# Patient Record
Sex: FEMALE | Race: WHITE | NOT HISPANIC OR LATINO | URBAN - METROPOLITAN AREA
[De-identification: names, ages, dates, MRNs, and addresses within clinical notes are randomized per-mention and may not be internally consistent; named-entity substitution may affect disease eponyms.]

---

## 2017-09-29 ENCOUNTER — IMPORTED ENCOUNTER (OUTPATIENT)
Dept: URBAN - METROPOLITAN AREA CLINIC 31 | Facility: CLINIC | Age: 67
End: 2017-09-29

## 2017-09-29 PROBLEM — H40.003: Noted: 2017-09-29

## 2017-09-29 PROCEDURE — 92015 DETERMINE REFRACTIVE STATE: CPT

## 2017-09-29 PROCEDURE — 92250 FUNDUS PHOTOGRAPHY W/I&R: CPT

## 2017-09-29 PROCEDURE — 92014 COMPRE OPH EXAM EST PT 1/>: CPT

## 2017-09-29 PROCEDURE — 92133 CPTRZD OPH DX IMG PST SGM ON: CPT

## 2017-09-29 NOTE — PATIENT DISCUSSION
1.  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 Brother has large ones also. Questionable signs of glaucomatous damage to the optic nerve based on todays examination and testing. Will continue to monitor. LVF 11/16 OD borderline OS normal.  OCT 9/29/17 OD normal OS thinning sup/temp. RNFL 97/76.  2. Rx change3. Going to [facility] for 2 mths. 4.  RTN 1 yr CE/OCT-  has VSP for gls

## 2018-04-04 ENCOUNTER — IMPORTED ENCOUNTER (OUTPATIENT)
Dept: URBAN - METROPOLITAN AREA CLINIC 31 | Facility: CLINIC | Age: 68
End: 2018-04-04

## 2018-04-04 PROBLEM — H43.811: Noted: 2018-04-04

## 2018-04-04 PROBLEM — H40.003: Noted: 2018-04-04

## 2018-04-04 PROCEDURE — 99214 OFFICE O/P EST MOD 30 MIN: CPT

## 2018-04-04 NOTE — PATIENT DISCUSSION
1.  NEW PVD OD: Patient was cautioned to call our office immediately if they experience a substantial change in their symptoms such as an increase in floaters persistent flashes loss of visual field (may appear as a shadow or a curtain) or decrease in visual acuity as these may indicate a retinal tear or detachment. 2.  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 Brother has large ones also. Questionable signs of glaucomatous damage to the optic nerve based on todays examination and testing. Will continue to monitor. LVF 11/16 OD borderline OS normal.  OCT 9/29/17 OD normal OS thinning sup/temp. RNFL 97/76. 3. Going to [facility] for 2 mths. 4.  RTN  1 mth DF/OPTOS OD5.  RTN 9/18 CE/OCT-  has VSP for gls

## 2018-04-04 NOTE — PATIENT DISCUSSION
1.  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 Brother has large ones also. Questionable signs of glaucomatous damage to the optic nerve based on todays examination and testing. Will continue to monitor. LVF 11/16 OD borderline OS normal.  OCT 9/29/17 OD normal OS thinning sup/temp. RNFL 97/76.  2. Rx change3. Going to [facility] for 2 mths. 4.  RTN 1 yr CE/OCT-  has VSP for gls

## 2018-05-04 ENCOUNTER — IMPORTED ENCOUNTER (OUTPATIENT)
Dept: URBAN - METROPOLITAN AREA CLINIC 31 | Facility: CLINIC | Age: 68
End: 2018-05-04

## 2018-05-04 PROBLEM — H40.003: Noted: 2018-05-04

## 2018-05-04 PROBLEM — H43.811: Noted: 2018-05-04

## 2018-05-04 PROCEDURE — 99214 OFFICE O/P EST MOD 30 MIN: CPT

## 2018-05-04 NOTE — PATIENT DISCUSSION
1.  PVD OD:   MUch improved per pt. Not seeing it much at all. Patient was cautioned to call our office immediately if they experience a substantial change in their symptoms such as an increase in floaters persistent flashes loss of visual field . 2.  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 Brother has large ones also. Questionable signs of glaucomatous damage to the optic nerve based on todays examination and testing. Will continue to monitor. LVF 11/16 OD borderline OS normal.  OCT 9/29/17 OD normal OS thinning sup/temp. RNFL 97/76. 3. Going to [facility] for 2 mths. 4. RTN  9/18 CE/OCT-  has VSP for gls

## 2018-09-24 ENCOUNTER — IMPORTED ENCOUNTER (OUTPATIENT)
Dept: URBAN - METROPOLITAN AREA CLINIC 31 | Facility: CLINIC | Age: 68
End: 2018-09-24

## 2018-09-24 PROBLEM — H43.811: Noted: 2018-09-24

## 2018-09-24 PROBLEM — H40.003: Noted: 2018-09-24

## 2018-09-24 PROBLEM — H40.013: Noted: 2018-09-24

## 2018-09-24 PROBLEM — H25.013: Noted: 2018-09-24

## 2018-09-24 PROCEDURE — 92015 DETERMINE REFRACTIVE STATE: CPT

## 2018-09-24 PROCEDURE — 92250 FUNDUS PHOTOGRAPHY W/I&R: CPT

## 2018-09-24 PROCEDURE — 92014 COMPRE OPH EXAM EST PT 1/>: CPT

## 2018-09-24 PROCEDURE — 92133 CPTRZD OPH DX IMG PST SGM ON: CPT

## 2018-09-24 NOTE — PATIENT DISCUSSION
1.  PVD OD:   MUch improved per pt. Not seeing it much at all. Patient was cautioned to call our office immediately if they experience a substantial change in their symptoms such as an increase in floaters persistent flashes loss of visual field . 2.  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 Brother has large ones also. Questionable signs of glaucomatous damage to the optic nerve based on todays examination and testing. Will continue to monitor. LVF 11/16 OD borderline OS normal.  OCT 9/29/17 OD normal OS thinning sup/temp. RNFL 97/76. 3. Going to [facility] for 2 mths. 4. RTN  9/18 CE/OCT-  has VSP for gls

## 2018-09-24 NOTE — PATIENT DISCUSSION
1. Trace Cataract OU: Explained how cataracts can effect vision. Recommend clinical observation. The patient was advised to contact us if any change or worsening of vision. 2. PVD OD:   MUch improved per pt. Not seeing it much at all. Patient was cautioned to call our office immediately if they experience a substantial change in their symptoms such as an increase in floaters persistent flashes loss of visual field . 3.  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 Brother has large ones also. Questionable signs of glaucomatous damage to the optic nerve based on todays examination and testing. Will continue to monitor. LVF 11/16 OD borderline OS normal.  OCT 9/24/18 OU thinning with OS more. RNFL 94/93.  worse than prev yr.  4. No rx changes. 5.  RTN 6 mths VF/OPtos. 6.  RTN  9/19 CE/OCT-  has VSP for gls

## 2019-03-08 ENCOUNTER — IMPORTED ENCOUNTER (OUTPATIENT)
Dept: URBAN - METROPOLITAN AREA CLINIC 31 | Facility: CLINIC | Age: 69
End: 2019-03-08

## 2019-03-08 PROBLEM — H43.811: Noted: 2019-03-08

## 2019-03-08 PROBLEM — H25.013: Noted: 2019-03-08

## 2019-03-08 PROBLEM — H40.003: Noted: 2019-03-08

## 2019-03-08 PROCEDURE — 92083 EXTENDED VISUAL FIELD XM: CPT

## 2019-03-08 PROCEDURE — 99213 OFFICE O/P EST LOW 20 MIN: CPT

## 2019-03-08 PROCEDURE — 92250 FUNDUS PHOTOGRAPHY W/I&R: CPT

## 2019-03-08 NOTE — PATIENT DISCUSSION
1. Trace Cataract OU: Explained how cataracts can effect vision. Recommend clinical observation. The patient was advised to contact us if any change or worsening of vision. 2. PVD OD:   MUch improved per pt. Not seeing it much at all. Patient was cautioned to call our office immediately if they experience a substantial change in their symptoms such as an increase in floaters persistent flashes loss of visual field . 3.  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 Brother has large ones also. Questionable signs of glaucomatous damage to the optic nerve based on todays examination and testing. Will continue to monitor. LVF 3/8/19 Stable from 2016. OD borderline OS normal.  OCT 9/24/18 OU thinning with OS more. RNFL 94/93.  worse than prev yr.  4. No rx changes.  5.  RTN 9/19 CE/OCT-  has VSP for gls

## 2019-10-04 ENCOUNTER — IMPORTED ENCOUNTER (OUTPATIENT)
Dept: URBAN - METROPOLITAN AREA CLINIC 31 | Facility: CLINIC | Age: 69
End: 2019-10-04

## 2019-10-04 PROBLEM — H40.003: Noted: 2019-10-04

## 2019-10-04 PROBLEM — H25.013: Noted: 2019-10-04

## 2019-10-04 PROBLEM — H43.811: Noted: 2019-10-04

## 2019-10-04 PROCEDURE — 92133 CPTRZD OPH DX IMG PST SGM ON: CPT

## 2019-10-04 PROCEDURE — 92014 COMPRE OPH EXAM EST PT 1/>: CPT

## 2019-10-04 PROCEDURE — 92015 DETERMINE REFRACTIVE STATE: CPT

## 2019-10-04 NOTE — PATIENT DISCUSSION
1. Trace Cataract OU: Explained how cataracts can effect vision. Recommend clinical observation. The patient was advised to contact us if any change or worsening of vision. 2. PVD OD:   MUch improved per pt. Not seeing it much at all. Patient was cautioned to call our office immediately if they experience a substantial change in their symptoms such as an increase in floaters persistent flashes loss of visual field . 3.  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 Brother has large ones also. Questionable signs of glaucomatous damage to the optic nerve based on todays examination and testing. Will continue to monitor. LVF 3/8/19 Stable from 2016. OD borderline OS normal.  OCT 10/4/19 OU thinning. RNFL 88/97.  stable 4. No rx changes. 5. RTN 6 mths DF/VF/optos6.    RTN 9/20 CE/OCT-  has VSP for gls

## 2019-12-20 NOTE — PATIENT DISCUSSION
High risk of CNVM, trace cystic change, Follow up as scheduled with CHUCK in 2 months-if fluid on OCT at that time refer back earlier to TP[facility].  Discussed increased risk of floaters, myopic degeneration and retinal detachment associated with high myopia.

## 2020-05-07 ENCOUNTER — IMPORTED ENCOUNTER (OUTPATIENT)
Dept: URBAN - METROPOLITAN AREA CLINIC 31 | Facility: CLINIC | Age: 70
End: 2020-05-07

## 2020-05-07 PROBLEM — H40.003: Noted: 2020-05-07

## 2020-05-07 PROBLEM — H25.013: Noted: 2020-05-07

## 2020-05-07 PROBLEM — H43.811: Noted: 2020-05-07

## 2020-05-07 PROBLEM — H40.013: Noted: 2020-05-07

## 2020-05-07 PROCEDURE — 99214 OFFICE O/P EST MOD 30 MIN: CPT

## 2020-05-07 PROCEDURE — 92250 FUNDUS PHOTOGRAPHY W/I&R: CPT

## 2020-05-07 NOTE — PATIENT DISCUSSION
1. Trace Cataract OU: Explained how cataracts can effect vision. Recommend clinical observation. The patient was advised to contact us if any change or worsening of vision. 2. PVD OD:   MUch improved per pt. Not seeing it much at all. Patient was cautioned to call our office immediately if they experience a substantial change in their symptoms such as an increase in floaters persistent flashes loss of visual field . 3.  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 Brother has large ones also. Questionable signs of glaucomatous damage to the optic nerve based on todays examination and testing. Will continue to monitor. LVF 3/8/19 Stable from 2016. OD borderline OS normal.  OCT 10/4/19 OU thinning. RNFL 88/97.  stable 4. No rx changes.  5. RTN 11/20 CE/VF and poss OCT nerve-  has VSP for gls---VF did not get done in May due to virus--

## 2020-10-09 ENCOUNTER — IMPORTED ENCOUNTER (OUTPATIENT)
Dept: URBAN - METROPOLITAN AREA CLINIC 31 | Facility: CLINIC | Age: 70
End: 2020-10-09

## 2020-10-09 PROBLEM — H40.003: Noted: 2020-10-09

## 2020-10-09 PROBLEM — H40.013: Noted: 2020-10-09

## 2020-10-09 PROBLEM — H43.811: Noted: 2020-10-09

## 2020-10-09 PROBLEM — H25.013: Noted: 2020-10-09

## 2020-10-09 PROCEDURE — 92250 FUNDUS PHOTOGRAPHY W/I&R: CPT

## 2020-10-09 PROCEDURE — 92014 COMPRE OPH EXAM EST PT 1/>: CPT

## 2020-10-09 PROCEDURE — 92015 DETERMINE REFRACTIVE STATE: CPT

## 2020-10-09 PROCEDURE — 92083 EXTENDED VISUAL FIELD XM: CPT

## 2020-10-09 NOTE — PATIENT DISCUSSION
1. Trace Cataract OU: Explained how cataracts can effect vision. Recommend clinical observation. The patient was advised to contact us if any change or worsening of vision. 2. PVD OD:   MUch improved per pt. Not seeing it much at all. Patient was cautioned to call our office immediately if they experience a substantial change in their symptoms such as an increase in floaters persistent flashes loss of visual field . 3.  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 Brother has large ones also. Questionable signs of glaucomatous damage to the optic nerve based on todays examination and testing. Will continue to monitor. LVF  10/9/20   OU normal. stable. OCT 10/4/19 OU thinning. RNFL 88/97.  stable 4. No rx changes.  5. RTN  5/21 DF/OCT Nerve-  has VSP for gls---

## 2021-01-20 NOTE — PATIENT DISCUSSION
Discussed today's IOP and HVF. Discussed possibility of repeating of SLT in the near future. Will continue to monitor closely for now.

## 2021-05-11 ENCOUNTER — IMPORTED ENCOUNTER (OUTPATIENT)
Dept: URBAN - METROPOLITAN AREA CLINIC 31 | Facility: CLINIC | Age: 71
End: 2021-05-11

## 2021-05-11 PROBLEM — H40.013: Noted: 2021-05-11

## 2021-05-11 PROBLEM — H40.003: Noted: 2021-05-11

## 2021-05-11 PROBLEM — H25.013: Noted: 2021-05-11

## 2021-05-11 PROBLEM — H43.811: Noted: 2021-05-11

## 2021-05-11 PROCEDURE — 99214 OFFICE O/P EST MOD 30 MIN: CPT

## 2021-05-11 PROCEDURE — 92250 FUNDUS PHOTOGRAPHY W/I&R: CPT

## 2021-05-11 NOTE — PATIENT DISCUSSION
1.  Cataract OU: Explained how cataracts can effect vision. Recommend clinical observation. The patient was advised to contact us if any change or worsening of vision. 2. PVD OD:   MUch improved per pt. Not seeing it much at all. Patient was cautioned to call our office immediately if they experience a substantial change in their symptoms such as an increase in floaters persistent flashes loss of visual field . 3.  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 Brother has large ones also. Questionable signs of glaucomatous damage to the optic nerve based on todays examination and testing. Will continue to monitor. LVF  10/9/20   OU normal. stable. OCT 10/4/19 OU thinning. RNFL 88/97.  stable 4. No rx changes.  5. RTN 6 mths CE/OCT nerve

## 2021-07-26 NOTE — PATIENT DISCUSSION
Explained CAYETANO and recommended regular use of PF ATs 3-4 times per day and adding PF Gel qhs. Refresh coupon given and recommended PF Refresh Celluvisc gtts.

## 2021-11-09 ENCOUNTER — IMPORTED ENCOUNTER (OUTPATIENT)
Dept: URBAN - METROPOLITAN AREA CLINIC 31 | Facility: CLINIC | Age: 71
End: 2021-11-09

## 2021-11-09 PROBLEM — H43.811: Noted: 2021-11-09

## 2021-11-09 PROBLEM — H40.013: Noted: 2021-11-09

## 2021-11-09 PROBLEM — H25.013: Noted: 2021-11-09

## 2021-11-09 PROBLEM — H40.003: Noted: 2021-11-09

## 2021-11-09 PROCEDURE — 92015 DETERMINE REFRACTIVE STATE: CPT

## 2021-11-09 PROCEDURE — 92133 CPTRZD OPH DX IMG PST SGM ON: CPT

## 2021-11-09 PROCEDURE — 92014 COMPRE OPH EXAM EST PT 1/>: CPT

## 2021-11-09 NOTE — PATIENT DISCUSSION
1.  Cataract OU: Explained how cataracts can effect vision. Recommend clinical observation. The patient was advised to contact us if any change or worsening of vision. 2. PVD OD:   MUch improved per pt. Not seeing it much at all. Patient was cautioned to call our office immediately if they experience a substantial change in their symptoms such as an increase in floaters persistent flashes loss of visual field . 3.  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 Brother has large ones also. Questionable signs of glaucomatous damage to the optic nerve based on todays examination and testing. Will continue to monitor. LVF  10/9/20   OU normal. stable. OCT 11/9/21 OU thinning. RNFL 106/109 from 88/97.  stable 4. No rx changes.  5. RTN 12 mths CE/VF/OPtos--Pt agreed to be seen every 12 mths now

## 2022-02-09 NOTE — PATIENT DISCUSSION
Pt was following with Dr. Frank Cruz yearly and has not followed with [facility] since 12/2020. Explained importance of following with [facility] and advised our office will assist in scheduling appt.

## 2022-02-09 NOTE — PATIENT DISCUSSION
PF Timoptic in Ocudose very expensive for Pt.  Discussed option of changing drops to PF drops through [facility] to improve cost.

## 2022-02-09 NOTE — PATIENT DISCUSSION
Change PF Timoptic in Ocudose and Zioptan to PF Richardson-Lat qdaily OU. [facility] and Pt given pharmacy info and advised pt to contact pharmacy to set up account.

## 2022-04-02 ASSESSMENT — TONOMETRY
OS_IOP_MMHG: 13
OS_IOP_MMHG: 14
OD_IOP_MMHG: 16
OD_IOP_MMHG: 13
OS_IOP_MMHG: 14
OD_IOP_MMHG: 14
OS_IOP_MMHG: 14
OS_IOP_MMHG: 14
OD_IOP_MMHG: 14
OD_IOP_MMHG: 13
OD_IOP_MMHG: 11
OS_IOP_MMHG: 14
OD_IOP_MMHG: 13
OS_IOP_MMHG: 14
OS_IOP_MMHG: 14
OS_IOP_MMHG: 16

## 2022-04-02 ASSESSMENT — VISUAL ACUITY
OD_SC: 20/20-2
OD_CC: 20/30
OS_SC: 20/20-1
OS_CC: 20/25
OD_SC: 20/20-1
OS_SC: 20/25+2

## 2022-06-08 NOTE — PATIENT DISCUSSION
PF Timoptic in Ocudose very expensive for Pt. Discussed option of a MIGs procedure to potentially reduce dependency drops. Pt prefers to continue drops at this time.

## 2022-06-08 NOTE — PATIENT DISCUSSION
Pt was following with Dr. Kina Barksdale yearly and has not followed with [facility] since 12/2020. Explained importance of following with [facility] and advised our office will assist in scheduling appt.

## 2022-07-08 NOTE — PATIENT DISCUSSION
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 Discussed benefits, alternatives, and risks of surgery including (but not limited to) cataract (if natural lens is still present), infection, bleeding, retinal detachment, optic neuropathy, loss of vision, blindness, and loss of eye. Patient was told the vision may not return to the same level as prior to development of the Pollardberg but should improve as the anatomy returns to a more normal contour. Patient understands most patients end up with some distortion even after successful macular hole surgery. No prediction of the level of visual improvement and/or the degree of distortion reduction can be given. TENTATIVE DATE.

## 2022-08-26 NOTE — PATIENT DISCUSSION
8/26/22: Based on today’s exam, diagnostic studies, and review of records, and the patient’s functional difficulty which appear to be a result of the MACULAR HOLE, the recommendation as made for a VITRECTOMY with ERM/ILM peel. Discussed benefits, alternatives, and risks of surgery including (but not limited to) cataract (if natural lens is still present), infection, bleeding, retinal detachment, optic neuropathy, loss of vision, blindness, and loss of eye. Patient was told the vision may not return to the same level as prior to development of the Pollardberg but should improve as the anatomy returns to a more normal contour. Patient understands most patients end up with some distortion even after successful macular hole surgery. No prediction of the level of visual improvement and/or the degree of distortion reduction can be given. TENTATIVE DATE.

## 2022-09-03 NOTE — PATIENT DISCUSSION
Recommended OBSERVATION and continued MONITORING for progression. Gilberto Hughes PA-C 6/16/22 1846PM: + Parainfluenza 3. Spoke w/ Family. Supportive care reviewed. F/U PMD. Strict return precautions.

## 2022-09-03 NOTE — PATIENT DISCUSSION
Post-op instructions given. Discussed drops, positioning, no strenuous activity and eye protection. Call immediately if eye pain or loss of vision. IOP 21 RECHECKED. WE'LL MONITOR. PT TO RESTART ANTIGLAUCOMA DROPS TODAY.

## 2022-09-03 NOTE — PATIENT DISCUSSION
9 3 22 IOP 62 W/ MICROCYSTIC EDEMA - TO START  QID.  TO TAP AC AND PT GIVEN  NOW - TO TAKE ONE MORE TIME AT BEDTIME.  TO GET ELECTROLYTES TUESDAY - RECOM BANANA QD - AC DEEP -  % FILL -  SO NOT COMPRESSION - ? STEROID INDUCED - DEXTENZA NOT WELL SEEN - IOP 8 AFTER AC TAP.  TO SEE DR DAVENPORT IN AM - TO HOLD ON GETTING RHOPRESSA UNTIL AFTER SEEING [facility] AT 9:30 AM TOMORROW.  TO CALL ME TONIGHT IF SHE HAD ^^ PAIN AND/OR NAUSEA AND VOMITING TO RETAP OR TO GO TO [facility] ER IN [place].  PT HAS MY CELL PHONE NUMBER.

## 2022-09-03 NOTE — PROCEDURE NOTE: CLINICAL
PROCEDURE NOTE: A/C Paracentesis, Therapeutic OS. Diagnosis: Glaucoma. Anesthesia: Topical. Prep: Betadine. Prior to procedure, risks/benefits/alternatives discussed including infection, loss of vision, hemorrhage, cataract, retinal tears or detachment and patient wished to proceed. All questions asked by the patient were answered in detail. Betadine prep was performed. Location of Paracentesis: Temporal Limbus. Volume of tap: 0.25 ml. SUB CONJ GAS REMOVED. Patient tolerated procedure well. There were no complications. Anti-biotic drops were then instilled into the post-operative eye. Post procedure IOP = 8 mmHg. Post procedure instructions given. Patient given office phone number/answering service number and advised to call immediately should there be an increase in floaters or redness, loss of vision or pain, or should they have any other questions or concerns. Jim Grissom

## 2022-09-04 NOTE — PATIENT DISCUSSION
Pt had eye surgery and IOP spike now much improved. Monitor IOP closely as she also has Dextenza implant.

## 2022-09-04 NOTE — PATIENT DISCUSSION
IOP much improved today. Will try holding PO Diamox for now. If pt feels IOP is increasing with pain/nausea she can restart the Diamox.

## 2022-09-04 NOTE — PATIENT DISCUSSION
RTC in 1 month for IOP Check as pt is seeing retina often, sooner if problems or changes. She can always call for appt if IOP increases.

## 2022-09-06 NOTE — PATIENT DISCUSSION
DISCUSSED RISK OF RETINAL TEAR or detachment, myopic degeneration, and glaucoma. Instructed to return immediately if new floaters, flashing lights, decreased central or peripheral vision, or pain. ,[email or web address]

## 2022-09-06 NOTE — PATIENT DISCUSSION
9 3 22 IOP 62 W/ MICROCYSTIC EDEMA - TO START  QID.  TO TAP AC AND PT GIVEN  NOW - TO TAKE ONE MORE TIME AT BEDTIME.  TO GET ELECTROLYTES TUESDAY - RECOM BANANA QD - AC DEEP -  % FILL -  SO NOT COMPRESSION - ? STEROID INDUCED - DEXTENZA NOT WELL SEEN - IOP 8 AFTER AC TAP.  TO SEE DR DAVENPORT IN AM - TO HOLD ON GETTING RHOPRESSA UNTIL AFTER SEEING [facility] AT 9:30 AM TOMORROW.  TO CALL ME TONIGHT IF SHE HAD ^^ PAIN AND/OR NAUSEA AND VOMITING TO RETAP OR TO GO TO [facility] ER IN [place].  PT HAS MY CELL PHONE NUMBER.

## 2022-09-07 NOTE — PATIENT DISCUSSION
9/7/22 IOP INCREASED AGAIN , WILL REMOVE GAS TAP VITREOUS TODAY. POST DEXTENZA COULD BE STEROID RESPONSE.

## 2022-09-07 NOTE — PATIENT DISCUSSION
9 3 22 IOP 62 W/ MICROCYSTIC EDEMA - TO START  QID.  TO TAP AC AND PT GIVEN  NOW - TO TAKE ONE MORE TIME AT BEDTIME.  TO GET ELECTROLYTES TUESDAY - RECOM BANANA QD - AC DEEP -  % FILL -  SO NOT COMPRESSION - ? STEROID INDUCED - DEXTENZA NOT WELL SEEN - IOP 8 AFTER AC TAP.  TO SEE DR DAVENPORT IN AM - TO HOLD ON GETTING RHOPRESSA UNTIL AFTER SEEING [facility] AT 9:30 AM TOMORROW.  TO CALL ME TONIGHT IF SHE HAD ^^ PAIN AND/OR NAUSEA AND VOMITING TO RETAP OR TO GO TO [facility] ER IN [place].  PT HAS MY CELL PHONE NUMBER.

## 2022-09-07 NOTE — PROCEDURE NOTE: CLINICAL
PROCEDURE NOTE: Vitreous Tap/Biopsy OS. Diagnosis: Glaucoma. Anesthesia: Subconjunctival. Prep: Antibiotic Drops/Betadine Flush q 5min x 3. Prior to procedure, risks/benefits/alternatives discussed including infection, loss of vision, hemorrhage, cataract, retinal tears or detachment and patient wished to proceed. Betadine prep was performed. Sterile lid speculum placed. Location of Tap: 3-4 mm from the limbus. Volume of Vitreous Tap: 0.3* ml. Description of Fluid: GAS*. IOP after procedure was 11* mmHg. Patient tolerated procedure well. There were no complications. Post procedure instructions given. Patient given office phone number/answering service number and advised to call immediately should there be an increase in floaters or redness, loss of vision or pain, or should they have any other questions or concerns. Camilo Burdick

## 2022-09-08 NOTE — PATIENT DISCUSSION
9 3 22 IOP 62 W/ MICROCYSTIC EDEMA - TO START  QID.  TO TAP AC AND PT GIVEN  NOW - TO TAKE ONE MORE TIME AT BEDTIME.  TO GET ELECTROLYTES TUESDAY - RECOM BANANA QD - AC DEEP -  % FILL -  SO NOT COMPRESSION - ? STEROID INDUCED - DEXTENZA NOT WELL SEEN - IOP 8 AFTER AC TAP.  TO SEE DR DAVENPORT IN AM - TO HOLD ON GETTING RHOPRESSA UNTIL AFTER SEEING [facility] AT 9:30 AM TOMORROW.  TO CALL ME TONIGHT IF SHE HAD ^^ PAIN AND/OR NAUSEA AND VOMITING TO RETAP OR TO GO TO [facility] ER IN [place].  PT HAS MY CELL PHONE NUMBER.

## 2022-09-08 NOTE — PATIENT DISCUSSION
9 8 22 IOP 27 - HAS BEEN ON DMX INTERMITENTLY FOR PAIN/HA - IOP 27 - TO RESUME DIAMOX QID - BANANA QD - F/U ELECTROLYTES MONDAY.

## 2022-09-08 NOTE — PATIENT DISCUSSION
Patient understands condition prognosis and need for follow up care. Is This A New Presentation, Or A Follow-Up?: Skin Lesion How Severe Is Your Skin Lesion?: moderate Has Your Skin Lesion Been Treated?: not been treated

## 2022-09-09 NOTE — PATIENT DISCUSSION
RTC 3-4 days for IOP Check as pt is seeing retina often, sooner if problems or changes. She can always call for appt if IOP increases.

## 2022-09-12 NOTE — PATIENT DISCUSSION
RTC next week for IOP Check as pt is seeing retina often, sooner if problems or changes. She can always call for appt if IOP increases.

## 2022-09-16 NOTE — PATIENT DISCUSSION
9 3 22 IOP 62 W/ MICROCYSTIC EDEMA - TO START  QID.  TO TAP AC AND PT GIVEN  NOW - TO TAKE ONE MORE TIME AT BEDTIME.  TO GET ELECTROLYTES TUESDAY - RECOM BANANA QD - AC DEEP -  % FILL -  SO NOT COMPRESSION - ? STEROID INDUCED - DEXTENZA NOT WELL SEEN - IOP 8 AFTER AC TAP.  TO SEE DR DAVENPORT IN AM - TO HOLD ON GETTING RHOPRESSA UNTIL AFTER SEEING [facility] AT 9:30 AM TOMORROW.  TO CALL ME TONIGHT IF SHE HAD ^^ PAIN AND/OR NAUSEA AND VOMITING TO RETAP OR TO GO TO [facility] ER IN [place].  PT HAS MY CELL PHONE NUMBER.

## 2022-09-16 NOTE — PATIENT DISCUSSION
9 3 22 IOP 62 W/ MICROCYSTIC EDEMA - TO START  QID.  TO TAP AC AND PT GIVEN  NOW - TO TAKE ONE MORE TIME AT BEDTIME.  TO GET ELECTROLYTES TUESDAY - RECOM BANANA QD - AC DEEP -  % FILL -  SO NOT COMPRESSION - ? STEROID INDUCED - DEXTENZA NOT WELL SEEN - IOP 8 AFTER AC TAP.  TO SEE DR DAVENPORT IN AM - TO HOLD ON GETTING RHOPRESSA UNTIL AFTER SEEING [facility] AT 9:30 AM TOMORROW.  TO CALL ME TONIGHT IF SHE HAD ^^ PAIN AND/OR NAUSEA AND VOMITING TO RETAP OR TO GO TO [facility] ER IN [place].  PT HAS MY CELL PHONE NUMBER.

## 2022-09-16 NOTE — PATIENT DISCUSSION
9/16/22: GIVEN RESPONSE TO TX AFTER SX. IOP within reasonable range TODAY. CONT MONITORING W DR Marybel Cates NEXT WK.

## 2022-09-21 NOTE — PATIENT DISCUSSION
RTC 1 week for IOP Check as pt is seeing retina often, sooner if problems or changes. She can always call for appt if IOP increases.

## 2022-10-12 NOTE — PATIENT DISCUSSION
RTC 2-3 weeks for IOP Check as pt is seeing retina often, sooner if problems or changes. She can always call for appt if IOP increases.

## 2022-10-12 NOTE — PATIENT DISCUSSION
----- Message from Mojgan Hurley sent at 10/12/2022  1:17 PM CDT -----  Regarding: Results  Contact: PT@ [phone]  Pt is requesting a call back regarding Results from procedure from 9/30/2022 please call to discuss further .           Call immediately to report any decreased vision or visual distortion.

## 2022-10-14 NOTE — PATIENT DISCUSSION
9 3 22 IOP 62 W/ MICROCYSTIC EDEMA - TO START  QID.  TO TAP AC AND PT GIVEN  NOW - TO TAKE ONE MORE TIME AT BEDTIME.  TO GET ELECTROLYTES TUESDAY - RECOM BANANA QD - AC DEEP -  % FILL -  SO NOT COMPRESSION - ? STEROID INDUCED - DEXTENZA NOT WELL SEEN - IOP 8 AFTER AC TAP.  TO SEE DR DAVENPORT IN AM - TO HOLD ON GETTING RHOPRESSA UNTIL AFTER SEEING [facility] AT 9:30 AM TOMORROW.  TO CALL ME TONIGHT IF SHE HAD ^^ PAIN AND/OR NAUSEA AND VOMITING TO RETAP OR TO GO TO [facility] ER IN [place].  PT HAS MY CELL PHONE NUMBER.

## 2022-10-14 NOTE — PATIENT DISCUSSION
9/16/22: GIVEN RESPONSE TO TX AFTER SX. IOP within reasonable range TODAY. CONT MONITORING W DR Wallace Rings NEXT WK.

## 2022-10-26 NOTE — PATIENT DISCUSSION
RTC 1 month IOP Check as pt is seeing retina often, sooner if problems or changes. She can always call for appt if IOP increases.

## 2022-11-21 NOTE — PATIENT DISCUSSION
PT would like to try decreasing  Diamox to qdaily. Wwill change to q daily and have pt RTC in 2 weeks to [facility] IOP.

## 2022-11-21 NOTE — PATIENT DISCUSSION
RTC 2 weeks for IOP Check as pt is seeing retina often, sooner if problems or changes. She can always call for appt if IOP increases.

## 2022-12-02 NOTE — PATIENT DISCUSSION
9 3 22 IOP 62 W/ MICROCYSTIC EDEMA - TO START  QID.  TO TAP AC AND PT GIVEN  NOW - TO TAKE ONE MORE TIME AT BEDTIME.  TO GET ELECTROLYTES TUESDAY - RECOM BANANA QD - AC DEEP -  % FILL -  SO NOT COMPRESSION - ? STEROID INDUCED - DEXTENZA NOT WELL SEEN - IOP 8 AFTER AC TAP.  TO SEE DR DAVENPORT IN AM - TO HOLD ON GETTING RHOPRESSA UNTIL AFTER SEEING [facility] AT 9:30 AM TOMORROW.  TO CALL ME TONIGHT IF SHE HAD ^^ PAIN AND/OR NAUSEA AND VOMITING TO RETAP OR TO GO TO [facility] ER IN [place].  PT HAS MY CELL PHONE NUMBER.

## 2022-12-02 NOTE — PATIENT DISCUSSION
9/16/22: GIVEN RESPONSE TO TX AFTER SX. IOP within reasonable range TODAY. CONT MONITORING W DR Sherri Duff NEXT WK.

## 2022-12-08 NOTE — PATIENT DISCUSSION
PT would like to try decreasing  Diamox to qdaily. Wwill change to q daily and have pt RTC in 2 weeks to [facility] IOP.

## 2022-12-08 NOTE — PATIENT DISCUSSION
RTC 1 month for IOP Check, 24-2 HVF and Refraction, as pt is seeing retina often, sooner if problems or changes. She can always call for appt if IOP increases.

## 2022-12-14 NOTE — PATIENT DISCUSSION
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 Discussed drops, positioning, no strenuous activity and eye protection. Call immediately if eye pain or loss of vision. IOP 21 RECHECKED. WE'LL MONITOR. PT TO RESTART ANTIGLAUCOMA DROPS TODAY.

## 2023-01-30 NOTE — PATIENT DISCUSSION
HVF performed as ordered. Mild progression OS but pt had significant IOP spike after retina surgery.
